# Patient Record
Sex: MALE | Race: WHITE | Employment: FULL TIME | URBAN - METROPOLITAN AREA
[De-identification: names, ages, dates, MRNs, and addresses within clinical notes are randomized per-mention and may not be internally consistent; named-entity substitution may affect disease eponyms.]

---

## 2017-02-15 ENCOUNTER — OFFICE VISIT (OUTPATIENT)
Dept: INTERNAL MEDICINE CLINIC | Facility: CLINIC | Age: 32
End: 2017-02-15

## 2017-02-15 VITALS
HEART RATE: 60 BPM | TEMPERATURE: 98 F | DIASTOLIC BLOOD PRESSURE: 90 MMHG | WEIGHT: 231 LBS | BODY MASS INDEX: 31.63 KG/M2 | HEIGHT: 71.5 IN | SYSTOLIC BLOOD PRESSURE: 145 MMHG

## 2017-02-15 DIAGNOSIS — N52.9 ERECTILE DYSFUNCTION, UNSPECIFIED ERECTILE DYSFUNCTION TYPE: ICD-10-CM

## 2017-02-15 DIAGNOSIS — Z76.89 ESTABLISHING CARE WITH NEW DOCTOR, ENCOUNTER FOR: ICD-10-CM

## 2017-02-15 DIAGNOSIS — F17.200 SMOKER: ICD-10-CM

## 2017-02-15 DIAGNOSIS — R53.83 FATIGUE, UNSPECIFIED TYPE: ICD-10-CM

## 2017-02-15 DIAGNOSIS — R59.9 SWOLLEN LYMPH NODES: Primary | ICD-10-CM

## 2017-02-15 LAB
ALBUMIN SERPL BCP-MCNC: 4.3 G/DL (ref 3.5–4.8)
ALBUMIN/GLOB SERPL: 1.6 {RATIO} (ref 1–2)
ALP SERPL-CCNC: 55 U/L (ref 32–100)
ALT SERPL-CCNC: 45 U/L (ref 17–63)
ANION GAP SERPL CALC-SCNC: 9 MMOL/L (ref 0–18)
AST SERPL-CCNC: 25 U/L (ref 15–41)
BASOPHILS # BLD: 0.1 K/UL (ref 0–0.2)
BASOPHILS NFR BLD: 1 %
BILIRUB SERPL-MCNC: 0.9 MG/DL (ref 0.3–1.2)
BUN SERPL-MCNC: 11 MG/DL (ref 8–20)
BUN/CREAT SERPL: 10.5 (ref 10–20)
CALCIUM SERPL-MCNC: 9.5 MG/DL (ref 8.5–10.5)
CHLORIDE SERPL-SCNC: 103 MMOL/L (ref 95–110)
CHOLEST SERPL-MCNC: 207 MG/DL (ref 110–200)
CO2 SERPL-SCNC: 25 MMOL/L (ref 22–32)
CREAT SERPL-MCNC: 1.05 MG/DL (ref 0.5–1.5)
EOSINOPHIL # BLD: 0.2 K/UL (ref 0–0.7)
EOSINOPHIL NFR BLD: 2 %
ERYTHROCYTE [DISTWIDTH] IN BLOOD BY AUTOMATED COUNT: 12.7 % (ref 11–15)
GLOBULIN PLAS-MCNC: 2.7 G/DL (ref 2.5–3.7)
GLUCOSE SERPL-MCNC: 87 MG/DL (ref 70–99)
HCT VFR BLD AUTO: 45.4 % (ref 41–52)
HDLC SERPL-MCNC: 34 MG/DL
HGB BLD-MCNC: 15.9 G/DL (ref 13.5–17.5)
LDLC SERPL CALC-MCNC: 120 MG/DL (ref 0–99)
LYMPHOCYTES # BLD: 2 K/UL (ref 1–4)
LYMPHOCYTES NFR BLD: 26 %
MCH RBC QN AUTO: 31.1 PG (ref 27–32)
MCHC RBC AUTO-ENTMCNC: 35 G/DL (ref 32–37)
MCV RBC AUTO: 88.9 FL (ref 80–100)
MONOCYTES # BLD: 0.6 K/UL (ref 0–1)
MONOCYTES NFR BLD: 8 %
NEUTROPHILS # BLD AUTO: 4.9 K/UL (ref 1.8–7.7)
NEUTROPHILS NFR BLD: 63 %
NONHDLC SERPL-MCNC: 173 MG/DL
OSMOLALITY UR CALC.SUM OF ELEC: 283 MOSM/KG (ref 275–295)
PLATELET # BLD AUTO: 314 K/UL (ref 140–400)
PMV BLD AUTO: 8.1 FL (ref 7.4–10.3)
POTASSIUM SERPL-SCNC: 3.8 MMOL/L (ref 3.3–5.1)
PROT SERPL-MCNC: 7 G/DL (ref 5.9–8.4)
RBC # BLD AUTO: 5.11 M/UL (ref 4.5–5.9)
SODIUM SERPL-SCNC: 137 MMOL/L (ref 136–144)
TRIGL SERPL-MCNC: 263 MG/DL (ref 1–149)
VIT B12 SERPL-MCNC: 469 PG/ML (ref 181–914)
WBC # BLD AUTO: 7.7 K/UL (ref 4–11)

## 2017-02-15 PROCEDURE — 99212 OFFICE O/P EST SF 10 MIN: CPT | Performed by: INTERNAL MEDICINE

## 2017-02-15 PROCEDURE — 36415 COLL VENOUS BLD VENIPUNCTURE: CPT | Performed by: INTERNAL MEDICINE

## 2017-02-15 PROCEDURE — 99204 OFFICE O/P NEW MOD 45 MIN: CPT | Performed by: INTERNAL MEDICINE

## 2017-02-15 RX ORDER — MULTIVITAMIN
1 TABLET ORAL DAILY
COMMUNITY
End: 2018-05-17

## 2017-02-15 NOTE — PROGRESS NOTES
HPI:    Patient ID: Jsesica Bales. Sandoval Tim is a 33year old male. HPI He is today to establish care with new physician.     According to him in August he noticed a lump on his  left groin, he was seen by  ,prescribed some antibiotic and was told if he Hematological: Negative for adenopathy. Does not bruise/bleed easily. Psychiatric/Behavioral: Negative for hallucinations, behavioral problems, confusion, sleep disturbance and agitation. The patient is not nervous/anxious.             Current Outpatient Eyes: Conjunctivae and EOM are normal. Pupils are equal, round, and reactive to light. Right eye exhibits no discharge. Left eye exhibits no discharge. No scleral icterus. Neck: Normal range of motion. Neck supple. No JVD present.  No tracheal tenderness Erectile dysfunction, unspecified erectile dysfunction type-going for sometime according to him his testosterone level was normal, advised him to cut smoking and drinking because they can cause erectal dysfunction ,he is not on any medication that can  cau

## 2017-02-15 NOTE — PATIENT INSTRUCTIONS
Evaluating Erectile Dysfunction    Many men feel embarrassed to talk to a doctor about erectile dysfunction (ED). This common problem can be treated, but only if your doctor knows about it. Your doctor will likely ask you questions about your ED.  Whether Date Last Reviewed: 9/23/2014  © 5193-8281 [address], [address] [place], [place] [address]. All rights reserved. This information is not intended as a substitute for professional medical care.  Always follow your healthcare professional Write down a few more ideas. Set limits  · Limit where you can smoke. Pick one room or a porch, and smoke only in that place. · Make smoking outdoors a house rule. Other smokers won’t tempt you as much.   · Speak to smokers around you about your intent

## 2017-02-16 LAB — HBA1C MFR BLD: 5.2 % (ref 4–6)

## 2017-02-17 LAB — 25(OH)D3 SERPL-MCNC: 17.5 NG/ML

## 2017-03-14 ENCOUNTER — OFFICE VISIT (OUTPATIENT)
Dept: SURGERY | Facility: CLINIC | Age: 32
End: 2017-03-14

## 2017-03-14 VITALS
BODY MASS INDEX: 30.81 KG/M2 | SYSTOLIC BLOOD PRESSURE: 114 MMHG | HEART RATE: 87 BPM | DIASTOLIC BLOOD PRESSURE: 78 MMHG | HEIGHT: 71.5 IN | WEIGHT: 225 LBS

## 2017-03-14 DIAGNOSIS — R19.09 LEFT GROIN MASS: ICD-10-CM

## 2017-03-14 DIAGNOSIS — R35.1 NOCTURIA: ICD-10-CM

## 2017-03-14 DIAGNOSIS — N52.02 CORPORO-VENOUS OCCLUSIVE ERECTILE DYSFUNCTION: Primary | ICD-10-CM

## 2017-03-14 PROCEDURE — 99213 OFFICE O/P EST LOW 20 MIN: CPT | Performed by: UROLOGY

## 2017-03-14 PROCEDURE — 99244 OFF/OP CNSLTJ NEW/EST MOD 40: CPT | Performed by: UROLOGY

## 2017-03-14 RX ORDER — TADALAFIL 20 MG/1
TABLET ORAL
Qty: 10 TABLET | Refills: 11 | Status: SHIPPED | OUTPATIENT
Start: 2017-03-14 | End: 2018-02-23

## 2017-03-14 NOTE — PROGRESS NOTES
Chiquis Lance. [facility] is a 33year old male. HPI:   Patient presents with:  Consult  Lump Mass (integumentary): left groin x 6 months  Erectile Dysfuntion    History provided by pt.     1. Voiding difficulties  His [employer] voiding s of previous records: 2/15/17 office visit Dr. Teresa Hardin: 1) swollen lymph nodes: on the left groin area; there is a small nodule? lipoma, vs lymph node - groin US ordered, pt did not do it 2) erectile dysfunction: testosterone levels was nml; referred diarrhea and vomiting    Neurological:  Negative for gait disturbance    Endocrine:  Negative for abnormal sleep patterns, increased activity, polydipsia and polyphagia    Allergic/Immuno:  Negative for environmental allergies and food allergies  Cardiovas unremarkable. Normal anus, normal rectal tone, no rectal masses. Seminal vesicles are nonpalpable.     STOOL IMPACTION none  PROSTATE nml  Skin/Hair: no unusual rashes present no abnormal bruising noted ; abdominal scars none  Back/Spine: no abnormalities AND TREATMENT PLAN      1. 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 discharge instructions (if applicable) and agree that the record reflects my personal performance and is accurate and complete.   Arnaldo Sanabria MD, 3/14/2017, 6:29 PM

## 2017-03-14 NOTE — PATIENT INSTRUCTIONS
1.  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       2.  Morning blood draw f

## 2017-05-15 ENCOUNTER — HOSPITAL ENCOUNTER (OUTPATIENT)
Age: 32
Discharge: HOME | End: 2017-05-15
Attending: EMERGENCY MEDICINE
Payer: COMMERCIAL

## 2017-05-15 VITALS
BODY MASS INDEX: 31.83 KG/M2 | SYSTOLIC BLOOD PRESSURE: 148 MMHG | HEART RATE: 95 BPM | HEIGHT: 72 IN | TEMPERATURE: 98 F | OXYGEN SATURATION: 96 % | WEIGHT: 235 LBS | RESPIRATION RATE: 16 BRPM | DIASTOLIC BLOOD PRESSURE: 90 MMHG

## 2017-05-15 DIAGNOSIS — H67.9 OTITIS MEDIA IN DISEASE CLASSIFIED ELSEWHERE, UNSPECIFIED LATERALITY: ICD-10-CM

## 2017-05-15 DIAGNOSIS — J06.9 UPPER RESPIRATORY TRACT INFECTION, UNSPECIFIED TYPE: Primary | ICD-10-CM

## 2017-05-15 PROCEDURE — 99203 OFFICE O/P NEW LOW 30 MIN: CPT

## 2017-05-15 PROCEDURE — 99204 OFFICE O/P NEW MOD 45 MIN: CPT

## 2017-05-15 PROCEDURE — 87430 STREP A AG IA: CPT

## 2017-05-15 RX ORDER — AMOXICILLIN 500 MG/1
500 TABLET, FILM COATED ORAL 3 TIMES DAILY
Qty: 21 TABLET | Refills: 0 | Status: SHIPPED | OUTPATIENT
Start: 2017-05-15 | End: 2017-05-22

## 2017-05-15 NOTE — ED PROVIDER NOTES
Patient Seen in: [facility] [place]    History   No chief complaint on file.     Stated Complaint: Ear Pain/Sore Throat    HPI    Patient presents with feeling congested for the past few days with cough congestion also earache so peripheral perfusion. Respiratory:  Lungs clear to auscultation bilaterally with good effort. No wheezes, ronchi, or rales. Musculoskeletal:  Good muscle tone. Skin:  Warm, dry, well perfused. Good skin turgor. No rashes seen.   Neurology:  Moving all

## 2017-05-15 NOTE — ED NOTES
Increase po fluids rest wash hands take and finished po meds follow up with pcp in3 days if not better spencer go to the ed.

## 2018-01-16 ENCOUNTER — OFFICE VISIT (OUTPATIENT)
Dept: OTOLARYNGOLOGY | Facility: CLINIC | Age: 33
End: 2018-01-16

## 2018-01-16 VITALS
DIASTOLIC BLOOD PRESSURE: 89 MMHG | TEMPERATURE: 98 F | BODY MASS INDEX: 31.5 KG/M2 | WEIGHT: 230 LBS | SYSTOLIC BLOOD PRESSURE: 132 MMHG | HEIGHT: 71.5 IN

## 2018-01-16 DIAGNOSIS — H65.23 BILATERAL CHRONIC SEROUS OTITIS MEDIA: Primary | ICD-10-CM

## 2018-01-16 PROCEDURE — 99213 OFFICE O/P EST LOW 20 MIN: CPT | Performed by: OTOLARYNGOLOGY

## 2018-01-16 PROCEDURE — 99212 OFFICE O/P EST SF 10 MIN: CPT | Performed by: OTOLARYNGOLOGY

## 2018-01-16 RX ORDER — CIPROFLOXACIN AND DEXAMETHASONE 3; 1 MG/ML; MG/ML
4 SUSPENSION/ DROPS AURICULAR (OTIC) EVERY 12 HOURS
Qty: 1 BOTTLE | Refills: 0 | Status: SHIPPED | OUTPATIENT
Start: 2018-01-16 | End: 2018-01-30

## 2018-01-16 NOTE — PROGRESS NOTES
Sonam Frances. Musaleona Schirmer is a 27year old male. Patient presents with:  Ear Problem: dark brown discharges of both ears for 2 weeks    HPI:   He was traveling recently and went snorkeling but did not put any plugs in his ears.  After the flight he started to h septum - Normal, Turbinates - Normal   Neurological Normal Memory - Normal. Cranial nerves - Cranial nerves II through XII grossly intact.    Neck Exam Normal Inspection - Normal. Palpation - Normal. Parotid gland - Normal. Thyroid gland - Normal.   Psychia

## 2018-02-23 ENCOUNTER — OFFICE VISIT (OUTPATIENT)
Dept: INTERNAL MEDICINE CLINIC | Facility: CLINIC | Age: 33
End: 2018-02-23

## 2018-02-23 VITALS
HEIGHT: 71 IN | WEIGHT: 221 LBS | BODY MASS INDEX: 30.94 KG/M2 | SYSTOLIC BLOOD PRESSURE: 104 MMHG | HEART RATE: 99 BPM | DIASTOLIC BLOOD PRESSURE: 68 MMHG

## 2018-02-23 DIAGNOSIS — M25.571 ACUTE RIGHT ANKLE PAIN: Primary | ICD-10-CM

## 2018-02-23 DIAGNOSIS — M79.671 RIGHT FOOT PAIN: ICD-10-CM

## 2018-02-23 DIAGNOSIS — K21.9 GASTROESOPHAGEAL REFLUX DISEASE, ESOPHAGITIS PRESENCE NOT SPECIFIED: ICD-10-CM

## 2018-02-23 LAB — URATE SERPL-MCNC: 8.8 MG/DL (ref 3.3–8.7)

## 2018-02-23 PROCEDURE — 99212 OFFICE O/P EST SF 10 MIN: CPT | Performed by: INTERNAL MEDICINE

## 2018-02-23 PROCEDURE — 99214 OFFICE O/P EST MOD 30 MIN: CPT | Performed by: INTERNAL MEDICINE

## 2018-02-23 PROCEDURE — 36415 COLL VENOUS BLD VENIPUNCTURE: CPT | Performed by: INTERNAL MEDICINE

## 2018-02-23 RX ORDER — INDOMETHACIN 50 MG/1
50 CAPSULE ORAL 2 TIMES DAILY WITH MEALS
Qty: 30 CAPSULE | Refills: 0 | Status: SHIPPED | OUTPATIENT
Start: 2018-02-23 | End: 2018-05-17 | Stop reason: ALTCHOICE

## 2018-02-23 NOTE — PROGRESS NOTES
HPI:    Patient ID: Deja Sarmiento. Osmar Robert is a 27year old male.     HPI  Patient comes in today with complaint of right foot and ankle pain this happened a few weeks ago initially he woke up with it then the pain went away but now for a few days he does ha Surgical History:  No date: [facility]. IMPLANT EAR TUBES      Comment: 09/2016   Family History   Problem Relation Age of Onset   • Lipids Father    • Hypertension Father       Social History: Smoking status: Current Some Day Smoker us know if not better      Orders Placed This Encounter      Uric Acid, Serum [E]    Meds This Visit:  Signed Prescriptions Disp Refills    indomethacin 50 MG Oral Cap 30 capsule 0      Sig: Take 1 capsule (50 mg total) by mouth 2 (two) times daily with me

## 2018-02-23 NOTE — PATIENT INSTRUCTIONS
ASSESSMENT/PLAN:   Acute right ankle pain  (primary encounter diagnosis) will order an x-ray also will treat with indomethacin as gout will use will send uric acid  Right foot pain as above will get x-ray  Gastroesophageal reflux disease, esophagitis prese

## 2018-05-17 ENCOUNTER — OFFICE VISIT (OUTPATIENT)
Dept: INTERNAL MEDICINE CLINIC | Facility: CLINIC | Age: 33
End: 2018-05-17

## 2018-05-17 VITALS
BODY MASS INDEX: 29.96 KG/M2 | WEIGHT: 214 LBS | HEIGHT: 71 IN | HEART RATE: 73 BPM | SYSTOLIC BLOOD PRESSURE: 120 MMHG | DIASTOLIC BLOOD PRESSURE: 80 MMHG

## 2018-05-17 DIAGNOSIS — N52.9 ERECTILE DYSFUNCTION, UNSPECIFIED ERECTILE DYSFUNCTION TYPE: Primary | ICD-10-CM

## 2018-05-17 PROCEDURE — 99212 OFFICE O/P EST SF 10 MIN: CPT | Performed by: INTERNAL MEDICINE

## 2018-05-17 PROCEDURE — 99213 OFFICE O/P EST LOW 20 MIN: CPT | Performed by: INTERNAL MEDICINE

## 2018-05-17 RX ORDER — SILDENAFIL 100 MG/1
100 TABLET, FILM COATED ORAL
Qty: 3 TABLET | Refills: 0 | Status: SHIPPED | OUTPATIENT
Start: 2018-05-17 | End: 2019-01-10

## 2018-05-17 NOTE — PATIENT INSTRUCTIONS
ASSESSMENT/PLAN:   Erectile dysfunction, unspecified erectile dysfunction type  (primary encounter diagnosis) performance anxiety disorder will prescribe generic of Viagra take as prescribed 30 minutes before get intermittent.   Side effects were told to pa

## 2018-05-17 NOTE — PROGRESS NOTES
HPI:    Patient ID: Pettyaline Bales. Sandoval Tim is a 27year old male.     HPI  Is in today with complaint of rectal dysfunction he has seen Dr. Sam Harris before I saw the notes prescribed Cialis patient's it was too expensive as per patient has had this problem s Cardiovascular: Normal rate, regular rhythm, normal heart sounds and intact distal pulses. Exam reveals no friction rub. No murmur heard. Pulmonary/Chest: Effort normal and breath sounds normal. No respiratory distress. He has no wheezes.  He has no

## 2018-06-05 ENCOUNTER — APPOINTMENT (OUTPATIENT)
Dept: LAB | Age: 33
End: 2018-06-05
Attending: INTERNAL MEDICINE
Payer: COMMERCIAL

## 2018-06-05 DIAGNOSIS — L74.512 SWEATY PALMS: ICD-10-CM

## 2018-06-05 DIAGNOSIS — E78.5 HYPERLIPIDEMIA, UNSPECIFIED HYPERLIPIDEMIA TYPE: ICD-10-CM

## 2018-06-05 DIAGNOSIS — F41.1 GENERALIZED ANXIETY DISORDER: ICD-10-CM

## 2018-06-05 PROCEDURE — 36415 COLL VENOUS BLD VENIPUNCTURE: CPT

## 2018-06-05 PROCEDURE — 84443 ASSAY THYROID STIM HORMONE: CPT

## 2018-06-05 NOTE — PATIENT INSTRUCTIONS
ASSESSMENT/PLAN:   Generalized anxiety disorder  (primary encounter diagnosis) will start her on trazodone which will help also with sleeping give it in 10 days 2 weeks for its full effect let us know if not better or worse any signs of suicidal or homicid

## 2018-06-05 NOTE — PROGRESS NOTES
HPI:    Patient ID: Emerald Olivera. Jud Locke is a 26year old male.     HPI  Patient comes in today with complaint of increased anxiety lately as per patient has always been anxious izabel but has been able to control it he used to drink a little more than usual tablet Rfl: 0     Allergies:No Known Allergies    HISTORY:  Past Medical History:   Diagnosis Date   • Hx of small bowel obstruction 12/31/2015      Past Surgical History:  No date: [facility]. IMPLANT EAR TUBES      Comment: 09/2016   Family History   Problem Rela suicidal or homicidal thoughts let us know.   Sweaty palms-we will check thyroid  Hyperlipidemia, unspecified hyperlipidemia type retest cholesterol watch diet      Orders Placed This Encounter      Lipid Panel [E], normal      TSH W Reflex To Free T4 [E],

## 2018-06-19 ENCOUNTER — APPOINTMENT (OUTPATIENT)
Dept: LAB | Age: 33
End: 2018-06-19
Attending: INTERNAL MEDICINE
Payer: COMMERCIAL

## 2018-06-19 DIAGNOSIS — E78.5 HYPERLIPIDEMIA, UNSPECIFIED HYPERLIPIDEMIA TYPE: ICD-10-CM

## 2018-06-19 DIAGNOSIS — F41.1 GENERALIZED ANXIETY DISORDER: ICD-10-CM

## 2018-06-19 DIAGNOSIS — L74.512 SWEATY PALMS: ICD-10-CM

## 2018-06-19 PROCEDURE — 80061 LIPID PANEL: CPT

## 2018-06-19 PROCEDURE — 36415 COLL VENOUS BLD VENIPUNCTURE: CPT

## 2018-08-15 NOTE — PATIENT INSTRUCTIONS
Anxiety  (primary encounter diagnosis).    D/c trazodone, will try Lexapro 10 mg daily, will give couple of  clonazepam until medication kicks in ,  discuss about side effects of medication  will refer him to Isreal Lozada if not feeling better or if you fill

## 2018-08-15 NOTE — PROGRESS NOTES
HPI:    Patient ID: Graeme Joy. is a 26year old male. HPI     He is complaining of severe anxiety . According  to him he is taking trazodone for 2 months but is not feeling any better.   Now even during the day he has  severe anxiety attacks and nervous/anxious. Current Outpatient Prescriptions:  Citalopram Hydrobromide 10 MG Oral Tab Take 1 tablet (10 mg total) by mouth daily.  Disp: 30 tablet Rfl: 1   ClonazePAM 0.5 MG Oral Tab Take 1 tablet (0.5 mg total) by mouth nightly as needed fo Conjunctivae and EOM are normal. Pupils are equal, round, and reactive to light. Right eye exhibits no discharge. Left eye exhibits no discharge. No scleral icterus. Neck: Normal range of motion. Neck supple. No JVD present.  No tracheal tenderness presen tablet (10 mg total) by mouth daily. ClonazePAM 0.5 MG Oral Tab 15 tablet 0      Sig: Take 1 tablet (0.5 mg total) by mouth nightly as needed for Anxiety.            Imaging & Referrals:  Salome Rausch        #[id number]

## 2018-09-19 RX ORDER — CLONAZEPAM 0.5 MG/1
TABLET ORAL
Qty: 15 TABLET | Refills: 0 | OUTPATIENT
Start: 2018-09-19

## 2018-09-19 NOTE — TELEPHONE ENCOUNTER
He was supposed to take clonazepam for few days until lexapro starts working ? Why he needs clonazepm? Did he follow up with psych?

## 2019-01-10 ENCOUNTER — MED REC SCAN ONLY (OUTPATIENT)
Dept: INTERNAL MEDICINE CLINIC | Facility: CLINIC | Age: 34
End: 2019-01-10

## 2019-01-10 ENCOUNTER — OFFICE VISIT (OUTPATIENT)
Dept: INTERNAL MEDICINE CLINIC | Facility: CLINIC | Age: 34
End: 2019-01-10
Payer: COMMERCIAL

## 2019-01-10 VITALS
WEIGHT: 216 LBS | BODY MASS INDEX: 29.58 KG/M2 | DIASTOLIC BLOOD PRESSURE: 72 MMHG | SYSTOLIC BLOOD PRESSURE: 116 MMHG | TEMPERATURE: 98 F | OXYGEN SATURATION: 98 % | HEIGHT: 71.5 IN | HEART RATE: 80 BPM

## 2019-01-10 DIAGNOSIS — Z00.00 ANNUAL PHYSICAL EXAM: Primary | ICD-10-CM

## 2019-01-10 DIAGNOSIS — E78.5 HYPERLIPIDEMIA, UNSPECIFIED HYPERLIPIDEMIA TYPE: ICD-10-CM

## 2019-01-10 DIAGNOSIS — R22.42 LUMP OF LEFT THIGH: ICD-10-CM

## 2019-01-10 DIAGNOSIS — F17.200 SMOKER: ICD-10-CM

## 2019-01-10 DIAGNOSIS — Z87.39 HISTORY OF GOUT: ICD-10-CM

## 2019-01-10 PROCEDURE — 90471 IMMUNIZATION ADMIN: CPT | Performed by: INTERNAL MEDICINE

## 2019-01-10 PROCEDURE — 90686 IIV4 VACC NO PRSV 0.5 ML IM: CPT | Performed by: INTERNAL MEDICINE

## 2019-01-10 PROCEDURE — 99395 PREV VISIT EST AGE 18-39: CPT | Performed by: INTERNAL MEDICINE

## 2019-01-10 NOTE — PROGRESS NOTES
HPI:    Patient ID: April Orona. is a 26year old male. HPI  Comes in for annual physical exam overall he is doing good his change his diet is quit drinking he smokes 5 cigarettes a day his gout is been under control he is not taking medication. Packs/day: 0.50      Smokeless tobacco: Never Used    Alcohol use:  Yes      Alcohol/week: 9.0 oz      Types: 15 Standard drinks or equivalent per week    Drug use: Yes      Types: Cannabis       PHYSICAL EXAM:    Physical Exam   Constitutional: He is orie Visit:  Requested Prescriptions      No prescriptions requested or ordered in this encounter       Imaging & Referrals:  [facility]  ([id number])        QQ#[id number]

## 2019-01-11 ENCOUNTER — HOSPITAL ENCOUNTER (OUTPATIENT)
Dept: ULTRASOUND IMAGING | Facility: HOSPITAL | Age: 34
Discharge: HOME | End: 2019-01-11
Attending: INTERNAL MEDICINE
Payer: COMMERCIAL

## 2019-01-11 DIAGNOSIS — R22.42 LUMP OF LEFT THIGH: ICD-10-CM

## 2019-01-11 PROCEDURE — 76882 US LMTD JT/FCL EVL NVASC XTR: CPT | Performed by: INTERNAL MEDICINE

## 2020-03-19 ENCOUNTER — TELEPHONE (OUTPATIENT)
Dept: INTERNAL MEDICINE CLINIC | Facility: CLINIC | Age: 35
End: 2020-03-19

## 2020-03-19 RX ORDER — FLUTICASONE PROPIONATE 50 MCG
2 SPRAY, SUSPENSION (ML) NASAL DAILY
Qty: 1 BOTTLE | Refills: 3 | Status: SHIPPED | OUTPATIENT
Start: 2020-03-19 | End: 2021-03-14

## 2020-03-19 NOTE — TELEPHONE ENCOUNTER
Patient called stating that he would like to be seen for his allergies. Pt states that he is sneezing and has sinus issues. Please contact patient to determine if they should be treated over the phone by you or requires further testing.   Please refer t

## 2021-01-21 ENCOUNTER — LAB ENCOUNTER (OUTPATIENT)
Dept: LAB | Facility: HOSPITAL | Age: 36
End: 2021-01-21
Attending: INTERNAL MEDICINE
Payer: COMMERCIAL

## 2021-01-21 ENCOUNTER — VIRTUAL PHONE E/M (OUTPATIENT)
Dept: INTERNAL MEDICINE CLINIC | Facility: CLINIC | Age: 36
End: 2021-01-21
Payer: COMMERCIAL

## 2021-01-21 DIAGNOSIS — R53.83 FATIGUE, UNSPECIFIED TYPE: ICD-10-CM

## 2021-01-21 DIAGNOSIS — R05.9 COUGH: ICD-10-CM

## 2021-01-21 DIAGNOSIS — J02.9 SORE THROAT: Primary | ICD-10-CM

## 2021-01-21 DIAGNOSIS — M79.10 MYALGIA: ICD-10-CM

## 2021-01-21 DIAGNOSIS — J02.9 SORE THROAT: ICD-10-CM

## 2021-01-21 PROCEDURE — 99213 OFFICE O/P EST LOW 20 MIN: CPT | Performed by: INTERNAL MEDICINE

## 2021-01-21 NOTE — PROGRESS NOTES
Virtual Telephone Check-In    Joey Treviño. verbally consents to a Virtual/Telephone Check-In visit on 01/21/21. Patient has been referred to the [facility] website at [email or web address] to review the yearly Consent to Treat document.     Patient sara

## 2021-01-22 LAB — SARS-COV-2 RNA RESP QL NAA+PROBE: NOT DETECTED

## 2023-01-25 ENCOUNTER — PATIENT OUTREACH (OUTPATIENT)
Dept: CASE MANAGEMENT | Age: 38
End: 2023-01-25

## 2023-01-25 NOTE — PROCEDURES
The office order for PCP request is Approved and completed on January 25, 2023.     Thanks,  [facility] [employer]

## (undated) NOTE — MR AVS SNAPSHOT
[address] [facility] [address]  [phone]               Thank you for choosing us for your health care visit with Bryce Sanchez MD.  We are glad to serve you and happy to provide you with this summary of your visi help show the health of your liver, kidneys, and prostate. · Blood flow tests check how well blood moves through your penis. · A rectal exam checks for an enlarged prostate gland. An enlarged prostate and ED have been linked in recent studies.   · Additio You’ll have the best chance of success if you join a stop-smoking group and have the support of your doctor, family and friends. Line up help  · Ask for the support of your family and friends.   · Join a smoking cessation class, or ask your healthcare p [facility], [address] ([place])    [address]   [phone]           Please bring in any pertinent lab or diagnostic test results with you to your appointment. [address]   Phone:  [phone]    Diagnoses:  Erectile dysfunction, unspecified erectile dysfunction type   Order:  Urology - Internal    PaulieSang mahoney MD   [place] [address]   [address] [facility] [address]   Phone:  [phone] authorization, such as [place], please feel free to schedule your appointment immediately. However, if you are unsure about the requirements for authorization, please wait 5-7 days and then contact your physician's office.  At that time, you will dairy products with reduced content of saturated and total fat.    Dietary sodium reduction Reduce dietary sodium intake to <= 100 mmol per day (2.4 g sodium or 6 g sodium chloride)   Aerobic physical activity Regular aerobic physical activity (e.g., brisk

## (undated) NOTE — MR AVS SNAPSHOT
[place]  [address]  [phone]               Thank you for choosing us for your health care visit with Eusebio Dupree MD.  We are glad to serve you and happy to provide you with this summ This list is accurate as of: 3/14/17  4:36 PM.  Always use your most recent med list.                ergocalciferol 48831 units Caps   Take 1 capsule (50,000 Units total) by mouth once a week.    Commonly known as:  DRISDOL/VITAMIN D2           MULTI VITAMI Water is best for hydration Fast food. Eat at home when possible     Tips for increasing your physical activity – Adults who are physically active are less likely to develop some chronic diseases than adults who are inactive.      HOW TO GET STARTED: HOW

## (undated) NOTE — LETTER
3/19/2020          To Whom It May Concern:    Bridgett Raven. is currently under my medical care and may return to work on 03/20/2020. Activity is restricted as follows: none. If you require additional information please contact our office.

## (undated) NOTE — ED AVS SNAPSHOT
[facility] in [place] [address]    Phone:  [phone]    Fax:  [phone]           Luc Madden   MRN: [id number]    Department:  [facility] in [address] [place] [address]   Date of Visit:  5/ deductible, co-payment, or co-insurance and for other services not covered under your health insurance plan. Please contact your insurance company and physician's office to determine coverage and benefits available for follow-up care and referrals.      It continue to take your medications as instructed by your Primary Care doctor until you can check with your doctor. Please bring the medication list to your next doctor's appointment.     Any imaging studies and labs completed today can be reviewed in your M can help with your Affordable Care Act coverage, as well as all types of Medicaid plans. To get signed up and covered, please call [phone] and ask to get set up for an insurance coverage that is in-network with Staci Thrasher

## (undated) NOTE — LETTER
2/23/2018          To Whom It May Concern:    Phillip Yuan. Laureen Fisher is currently under my medical care and may not return to work at this time. Please excuse Phillip Yuan. for 2 days. He may return to work on 02/26/2018.   Activity is restricted as follows

## (undated) NOTE — LETTER
[address]  [address] [facility]  Dept: [phone]  Dept Fax: [phone]  Loc: [phone]      May 15, 2017    Patient: Zackery Gonzalez   Date of Visit: 5/15/2017       To Whom It May Concern:    Mame Estrada